# Patient Record
Sex: FEMALE | Race: WHITE | Employment: OTHER | URBAN - METROPOLITAN AREA
[De-identification: names, ages, dates, MRNs, and addresses within clinical notes are randomized per-mention and may not be internally consistent; named-entity substitution may affect disease eponyms.]

---

## 2018-11-23 ENCOUNTER — OFFICE VISIT (OUTPATIENT)
Dept: INTERNAL MEDICINE | Facility: CLINIC | Age: 78
End: 2018-11-23
Payer: MEDICARE

## 2018-11-23 VITALS
HEART RATE: 62 BPM | HEIGHT: 60 IN | BODY MASS INDEX: 24.35 KG/M2 | WEIGHT: 124 LBS | TEMPERATURE: 98.2 F | DIASTOLIC BLOOD PRESSURE: 80 MMHG | OXYGEN SATURATION: 98 % | SYSTOLIC BLOOD PRESSURE: 138 MMHG

## 2018-11-23 DIAGNOSIS — N81.4 UTERINE PROLAPSE: ICD-10-CM

## 2018-11-23 DIAGNOSIS — I48.91 ATRIAL FIBRILLATION, NEW ONSET (H): ICD-10-CM

## 2018-11-23 DIAGNOSIS — I35.0 AORTIC STENOSIS, SEVERE: Primary | ICD-10-CM

## 2018-11-23 LAB
ANION GAP SERPL CALCULATED.3IONS-SCNC: 5 MMOL/L (ref 3–14)
BUN SERPL-MCNC: 12 MG/DL (ref 7–30)
CALCIUM SERPL-MCNC: 8.8 MG/DL (ref 8.5–10.1)
CHLORIDE SERPL-SCNC: 104 MMOL/L (ref 94–109)
CO2 SERPL-SCNC: 31 MMOL/L (ref 20–32)
CREAT SERPL-MCNC: 1.03 MG/DL (ref 0.52–1.04)
ERYTHROCYTE [DISTWIDTH] IN BLOOD BY AUTOMATED COUNT: 14.3 % (ref 10–15)
GFR SERPL CREATININE-BSD FRML MDRD: 52 ML/MIN/1.7M2
GLUCOSE SERPL-MCNC: 126 MG/DL (ref 70–99)
HCT VFR BLD AUTO: 36.8 % (ref 35–47)
HGB BLD-MCNC: 11.4 G/DL (ref 11.7–15.7)
MCH RBC QN AUTO: 30.7 PG (ref 26.5–33)
MCHC RBC AUTO-ENTMCNC: 31 G/DL (ref 31.5–36.5)
MCV RBC AUTO: 99 FL (ref 78–100)
PLATELET # BLD AUTO: 295 10E9/L (ref 150–450)
POTASSIUM SERPL-SCNC: 4.3 MMOL/L (ref 3.4–5.3)
RBC # BLD AUTO: 3.71 10E12/L (ref 3.8–5.2)
SODIUM SERPL-SCNC: 140 MMOL/L (ref 133–144)
WBC # BLD AUTO: 9.3 10E9/L (ref 4–11)

## 2018-11-23 PROCEDURE — 99203 OFFICE O/P NEW LOW 30 MIN: CPT | Performed by: INTERNAL MEDICINE

## 2018-11-23 PROCEDURE — 80048 BASIC METABOLIC PNL TOTAL CA: CPT | Performed by: INTERNAL MEDICINE

## 2018-11-23 PROCEDURE — 85027 COMPLETE CBC AUTOMATED: CPT | Performed by: INTERNAL MEDICINE

## 2018-11-23 PROCEDURE — 36415 COLL VENOUS BLD VENIPUNCTURE: CPT | Performed by: INTERNAL MEDICINE

## 2018-11-23 NOTE — PROGRESS NOTES
SUBJECTIVE:   Syeda Alicea is a 78 year old female who presents to clinic today for the following health issues:          Hospital Follow-up Visit:    Hospital/Nursing Home/IP Rehab Facility: [facility]  Date of Admission: 11/07  Date of Discharge: 11/18  Reason(s) for Admission: Nonrheumatic aortic valve stenosis              Problems taking medications regularly:  None       Medication changes since discharge: None       Problems adhering to non-medication therapy:  None    Summary of hospitalization:  [facility] discharge summary reviewed  Diagnostic Tests/Treatments reviewed.  Follow up needed: none  Other Healthcare Providers Involved in Patient s Care:         None  Update since discharge: improved.     Post Discharge Medication Reconciliation: discharge medications reconciled, continue medications without change.  Plan of care communicated with patient     Coding guidelines for this visit:  Type of Medical   Decision Making Face-to-Face Visit       within 7 Days of discharge Face-to-Face Visit        within 14 days of discharge   Moderate Complexity [id number]   High Complexity [id number]     Normally lives outside of [facility].  Came down after hospital discharge to stay with her daughter who lives in the [facility].    Since home from hospital, they report that they are feeling better.   Energy level and stamina are poor, but slowly improving.    Appetite and intake are decreased, but slowly improving.   No fevers, no chills  No shortness of breath.   No abdominal pain.   They have not returned to prior routine and activities.              Problem list and histories reviewed & adjusted, as indicated.  Additional history: as documented        Reviewed and updated as needed this visit by clinical staff  Allergies  Meds       Reviewed and updated as needed this visit by Provider             Past Medical History:  ---------------------------  Past Medical History:    Diagnosis Date     Aortic stenosis, severe 11/07/2018    severe AS, hospitalized for acute pulmonary edema     Atrial fibrillation, new onset (H) 11/07/2018    notice as part of acute hospitalization for acute severe AS       Past Surgical History:  ---------------------------  No past surgical history on file.    Current Medications:  ---------------------------  Current Outpatient Prescriptions   Medication Sig Dispense Refill     AMIODARONE HCL PO Take 200 mg by mouth 2 times daily       APIXABAN PO Take 5 mg by mouth 2 times daily       [START ON 11/26/2018] conjugated estrogens (PREMARIN) cream Place 0.5 g vaginally twice a week       FUROSEMIDE PO Take 40 mg by mouth daily         Allergies:  -------------  Not on File    Social History:  -------------------  Social History     Social History     Marital status:      Spouse name: N/A     Number of children: N/A     Years of education: N/A     Occupational History     Not on file.     Social History Main Topics     Smoking status: Never Smoker     Smokeless tobacco: Never Used     Alcohol use Not on file     Drug use: Not on file     Sexual activity: Not on file     Other Topics Concern     Not on file     Social History Narrative     No narrative on file       Family Medical History:  ------------------------------  No family history on file.      ROS:  REVIEW OF SYSTEMS: (since discharge from hospital)    RESP: negative for cough, wheezing, hemoptysis  CV: negative for chest pain, palpitations, PND, orthopnea  GI: negative for dysphagia, N/V, pain, melena, diarrhea and constipation  NEURO: negative for numbness/tingling, paralysis, incoordination, or focal weakness     OBJECTIVE:                                                    /80  Pulse 62  Temp 98.2  F (36.8  C) (Oral)  Ht 5' (1.524 m)  Wt 124 lb (56.2 kg)  SpO2 98%  BMI 24.22 kg/m2     GENERAL alert and no distress  EYES:  Normal sclera,conjunctiva, EOMI  HENT: oral and posterior  pharynx without lesions or erythema, facies symmetric  NECK: Neck supple. No LAD, without thyroidmegaly or JVD., Carotids without bruits.  RESP: Clear to ausculation bilaterally without wheezes or crackles. Normal BS in all fields.  CV: RRR normal S1S2, loud systolic murmur, loudest at upper elft sternal border, without rubs or gallops. PMI normal  LYMPH: no cervical lymph adenopathy appreciated  MS: extremities- no gross deformities of the visible extremities noted, no edema  PSYCH: Alert and oriented times 3; speech- coherent  SKIN:  No obvious significant skin lesions on visible portions of face     Results for orders placed or performed in visit on 11/23/18   CBC with platelets   Result Value Ref Range    WBC 9.3 4.0 - 11.0 10e9/L    RBC Count 3.71 (L) 3.8 - 5.2 10e12/L    Hemoglobin 11.4 (L) 11.7 - 15.7 g/dL    Hematocrit 36.8 35.0 - 47.0 %    MCV 99 78 - 100 fl    MCH 30.7 26.5 - 33.0 pg    MCHC 31.0 (L) 31.5 - 36.5 g/dL    RDW 14.3 10.0 - 15.0 %    Platelet Count 295 150 - 450 10e9/L   Basic metabolic panel   Result Value Ref Range    Sodium 140 133 - 144 mmol/L    Potassium 4.3 3.4 - 5.3 mmol/L    Chloride 104 94 - 109 mmol/L    Carbon Dioxide 31 20 - 32 mmol/L    Anion Gap 5 3 - 14 mmol/L    Glucose 126 (H)  (non-fasting) 70 - 99 mg/dL    Urea Nitrogen 12 7 - 30 mg/dL    Creatinine 1.03 0.52 - 1.04 mg/dL    GFR Estimate 52 (L) >60 mL/min/1.7m2    GFR Estimate If Black 63 >60 mL/min/1.7m2    Calcium 8.8 8.5 - 10.1 mg/dL         ASSESSMENT/PLAN:                                                      (I35.0) Aortic stenosis, severe  (primary encounter diagnosis)  Comment: severe aortic stenosis.   Has follow-up already arranged at the [facility] on Monday 11/26.  She deafly needs to have valve replacement.  Hopefully can be accomplished through [facility].  No current signs of heart failure today.  She feels her breathing is easier.  Recheck labs to confirm normal renal function since starting  furosemide.  Plan: CBC with platelets, Basic metabolic panel            (I48.91) Atrial fibrillation, new onset (H)  Comment: New onset atrial fibrillation, related to the aortic valve.  Rate controlled.  On Eliquis for anticoagulation.  Plan: CBC with platelets, Basic metabolic panel            (N81.4) Uterine prolapse  Comment: Mild vaginal bleeding, given estrogen cream by gynecologist at the [facility].  She may experience more vaginal bleeding while on Eliquis from this.  Eventual referral to GYN for discussions on more specific evaluation once the aortic valve issue is managed  Plan: conjugated estrogens (PREMARIN) cream               See Patient Instructions    RACHANA VALLES M.D., MD  [facility]       Call daughter Rj with results on her number

## 2018-11-23 NOTE — MR AVS SNAPSHOT
After Visit Summary   11/23/2018    Syeda Alicea    MRN: [id number]           Patient Information     Date Of Birth          1940        Visit Information        Provider Department      11/23/2018 10:20 AM Rick Chan MD [facility]        Today's Diagnoses     Aortic stenosis, severe    -  1    Atrial fibrillation, new onset (H)        Uterine prolapse          Care Instructions    *  Continue all medications at the same doses.  Contact your usual pharmacy if you need refills.     *  Follow up as planned in the Cardiology Clinic at [facility].      *  Follow any instructions from the Cardiology clinic    *  Conitnue the oxygen for now, you will not  likely need it for much longer.     *  Be sure to get enough calories and protein to help healing, especially since you are now missing all of your teeth.     *  Prepare your food so that you can eat them without the teeth.     *  COnsdier protein supplement shakse ( e.g. Boost, Ensure, Premier Proetin from [employer], etc.)    *  Get dentures when you are able to.      *  Follow up with Gynecologist at some point after the heart valve issue has been resolved to discuss options for evaluations and management of the uterine prolapse.      *  Follow up with your primary MD back in [place] with you get back there.           Follow-ups after your visit        Who to contact     If you have questions or need follow up information about today's clinic visit or your schedule please contact [facility] directly at [phone].  Normal or non-critical lab and imaging results will be communicated to you by MyChart, letter or phone within 4 business days after the clinic has received the results. If you do not hear from us within 7 days, please contact the clinic through MyChart or phone. If you have a critical or abnormal lab result, we will notify you by phone as soon as possible.  Submit refill  "requests through [employer] or call your pharmacy and they will forward the refill request to us. Please allow 3 business days for your refill to be completed.          Additional Information About Your Visit        [employer] Information     [employer] lets you send messages to your doctor, view your test results, renew your prescriptions, schedule appointments and more. To sign up, go to [email or web address] . Click on \"Log in\" on the left side of the screen, which will take you to the Welcome page. Then click on \"Sign up Now\" on the right side of the page.     You will be asked to enter the access code listed below, as well as some personal information. Please follow the directions to create your username and password.     Your access code is: [email or web address]  Expires: 2019 11:06 AM     Your access code will  in 90 days. If you need help or a new code, please call your [place] clinic or [phone].        Care EveryWhere ID     This is your Care EveryWhere ID. This could be used by other organizations to access your [place] medical records  [id number]        Your Vitals Were     Pulse Temperature Height Pulse Oximetry BMI (Body Mass Index)       62 98.2  F (36.8  C) (Oral) 5' (1.524 m) 98% 24.22 kg/m2        Blood Pressure from Last 3 Encounters:   18 138/80    Weight from Last 3 Encounters:   18 124 lb (56.2 kg)              We Performed the Following     Basic metabolic panel     CBC with platelets        Primary Care Provider Fax #    Physician No Ref-Primary [phone]       No address on file        Equal Access to Services     [facility]: Hadii senia sommer Sovictorina, waaxda luqadaha, qaybta kaalmada pam melton . So [facility] [phone].    ATENCIÓN: Si habla español, tiene a bacon disposición servicios gratuitos de asistencia lingüística. Llame al [phone].    We comply with applicable federal civil rights laws and [place] laws. We do not " discriminate on the basis of race, color, national origin, age, disability, sex, sexual orientation, or gender identity.            Thank you!     Thank you for choosing [facility]  for your care. Our goal is always to provide you with excellent care. Hearing back from our patients is one way we can continue to improve our services. Please take a few minutes to complete the written survey that you may receive in the mail after your visit with us. Thank you!             Your Updated Medication List - Protect others around you: Learn how to safely use, store and throw away your medicines at www.disposemymeds.org.          This list is accurate as of 11/23/18 11:06 AM.  Always use your most recent med list.                   Brand Name Dispense Instructions for use Diagnosis    AMIODARONE HCL PO      Take 200 mg by mouth 2 times daily        APIXABAN PO      Take 5 mg by mouth 2 times daily        conjugated estrogens cream   Start taking on:  11/26/2018    PREMARIN     Place 0.5 g vaginally twice a week    Uterine prolapse       FUROSEMIDE PO      Take 40 mg by mouth daily

## 2018-11-23 NOTE — PATIENT INSTRUCTIONS
*  Continue all medications at the same doses.  Contact your usual pharmacy if you need refills.     *  Follow up as planned in the Cardiology Clinic at [facility].      *  Follow any instructions from the Cardiology clinic    *  Conitnue the oxygen for now, you will not  likely need it for much longer.     *  Be sure to get enough calories and protein to help healing, especially since you are now missing all of your teeth.     *  Prepare your food so that you can eat them without the teeth.     *  COnsdier protein supplement shakse ( e.g. Boost, Ensure, Premier Proetin from [employer], etc.)    *  Get dentures when you are able to.      *  Follow up with Gynecologist at some point after the heart valve issue has been resolved to discuss options for evaluations and management of the uterine prolapse.      *  Follow up with your primary MD back in [place] with you get back there.

## 2020-03-11 ENCOUNTER — HEALTH MAINTENANCE LETTER (OUTPATIENT)
Age: 80
End: 2020-03-11

## 2021-01-03 ENCOUNTER — HEALTH MAINTENANCE LETTER (OUTPATIENT)
Age: 81
End: 2021-01-03

## 2021-04-25 ENCOUNTER — HEALTH MAINTENANCE LETTER (OUTPATIENT)
Age: 81
End: 2021-04-25

## 2021-10-10 ENCOUNTER — HEALTH MAINTENANCE LETTER (OUTPATIENT)
Age: 81
End: 2021-10-10

## 2022-05-21 ENCOUNTER — HEALTH MAINTENANCE LETTER (OUTPATIENT)
Age: 82
End: 2022-05-21

## 2022-09-18 ENCOUNTER — HEALTH MAINTENANCE LETTER (OUTPATIENT)
Age: 82
End: 2022-09-18

## 2023-06-04 ENCOUNTER — HEALTH MAINTENANCE LETTER (OUTPATIENT)
Age: 83
End: 2023-06-04